# Patient Record
Sex: FEMALE | Race: WHITE | NOT HISPANIC OR LATINO
[De-identification: names, ages, dates, MRNs, and addresses within clinical notes are randomized per-mention and may not be internally consistent; named-entity substitution may affect disease eponyms.]

---

## 2017-01-17 ENCOUNTER — APPOINTMENT (OUTPATIENT)
Dept: OBGYN | Facility: CLINIC | Age: 32
End: 2017-01-17

## 2017-01-17 VITALS
SYSTOLIC BLOOD PRESSURE: 137 MMHG | WEIGHT: 125 LBS | BODY MASS INDEX: 20.09 KG/M2 | DIASTOLIC BLOOD PRESSURE: 78 MMHG | HEIGHT: 66 IN

## 2017-01-17 DIAGNOSIS — Z80.1 FAMILY HISTORY OF MALIGNANT NEOPLASM OF TRACHEA, BRONCHUS AND LUNG: ICD-10-CM

## 2017-01-18 LAB
CANDIDA VAG CYTO: NOT DETECTED
G VAGINALIS+PREV SP MTYP VAG QL MICRO: NOT DETECTED
HBV SURFACE AG SER QL: NONREACTIVE
HCV AB SER QL: NONREACTIVE
HCV S/CO RATIO: 0.07 S/CO
HIV1+2 AB SPEC QL IA.RAPID: NONREACTIVE
RPR SER QL: NORMAL
T VAGINALIS VAG QL WET PREP: NOT DETECTED

## 2017-01-19 LAB
C TRACH DNA SPEC QL NAA+PROBE: NORMAL
C TRACH RRNA SPEC QL NAA+PROBE: NORMAL
HPV HIGH+LOW RISK DNA PNL CVX: NEGATIVE
N GONORRHOEA DNA SPEC QL NAA+PROBE: NORMAL
N GONORRHOEA RRNA SPEC QL NAA+PROBE: NORMAL
SOURCE AMPLIFICATION: NORMAL

## 2017-01-23 ENCOUNTER — APPOINTMENT (OUTPATIENT)
Dept: ALLERGY | Facility: CLINIC | Age: 32
End: 2017-01-23

## 2017-01-23 VITALS
SYSTOLIC BLOOD PRESSURE: 120 MMHG | BODY MASS INDEX: 20.09 KG/M2 | DIASTOLIC BLOOD PRESSURE: 80 MMHG | RESPIRATION RATE: 14 BRPM | HEIGHT: 66 IN | WEIGHT: 125 LBS | HEART RATE: 72 BPM

## 2017-01-23 LAB — CYTOLOGY CVX/VAG DOC THIN PREP: NORMAL

## 2017-01-23 RX ORDER — CHROMIUM 200 MCG
TABLET ORAL
Refills: 0 | Status: ACTIVE | COMMUNITY

## 2017-01-23 RX ORDER — VITAMIN E ACID SUCCINATE 268 MG
TABLET ORAL
Refills: 0 | Status: ACTIVE | COMMUNITY

## 2017-01-23 RX ORDER — ASCORBIC ACID 500 MG
TABLET ORAL
Refills: 0 | Status: ACTIVE | COMMUNITY

## 2017-01-28 ENCOUNTER — TRANSCRIPTION ENCOUNTER (OUTPATIENT)
Age: 32
End: 2017-01-28

## 2017-02-02 ENCOUNTER — APPOINTMENT (OUTPATIENT)
Dept: ALLERGY | Facility: CLINIC | Age: 32
End: 2017-02-02

## 2017-02-03 ENCOUNTER — APPOINTMENT (OUTPATIENT)
Dept: FAMILY MEDICINE | Facility: CLINIC | Age: 32
End: 2017-02-03

## 2017-02-03 VITALS
TEMPERATURE: 97.9 F | BODY MASS INDEX: 20.34 KG/M2 | WEIGHT: 126 LBS | SYSTOLIC BLOOD PRESSURE: 116 MMHG | DIASTOLIC BLOOD PRESSURE: 70 MMHG | RESPIRATION RATE: 14 BRPM | HEART RATE: 64 BPM

## 2017-02-03 DIAGNOSIS — Z00.00 ENCOUNTER FOR GENERAL ADULT MEDICAL EXAMINATION W/OUT ABNORMAL FINDINGS: ICD-10-CM

## 2017-02-03 DIAGNOSIS — Z20.2 CONTACT WITH AND (SUSPECTED) EXPOSURE TO INFECTIONS WITH A PREDOMINANTLY SEXUAL MODE OF TRANSMISSION: ICD-10-CM

## 2017-02-03 DIAGNOSIS — Z78.9 OTHER SPECIFIED HEALTH STATUS: ICD-10-CM

## 2017-02-03 DIAGNOSIS — Z82.49 FAMILY HISTORY OF ISCHEMIC HEART DISEASE AND OTHER DISEASES OF THE CIRCULATORY SYSTEM: ICD-10-CM

## 2017-02-03 DIAGNOSIS — Z01.419 ENCOUNTER FOR GYNECOLOGICAL EXAMINATION (GENERAL) (ROUTINE) W/OUT ABNORMAL FINDINGS: ICD-10-CM

## 2017-02-03 DIAGNOSIS — D24.1 BENIGN NEOPLASM OF RIGHT BREAST: ICD-10-CM

## 2017-02-03 DIAGNOSIS — Z12.4 ENCOUNTER FOR GYNECOLOGICAL EXAMINATION (GENERAL) (ROUTINE) W/OUT ABNORMAL FINDINGS: ICD-10-CM

## 2017-02-03 DIAGNOSIS — T14.8 OTHER INJURY OF UNSPECIFIED BODY REGION: ICD-10-CM

## 2017-02-03 DIAGNOSIS — H57.8 OTHER SPECIFIED DISORDERS OF EYE AND ADNEXA: ICD-10-CM

## 2017-02-03 RX ORDER — MINOCYCLINE HYDROCHLORIDE 100 MG/1
100 CAPSULE ORAL
Refills: 0 | Status: DISCONTINUED | COMMUNITY
End: 2017-02-03

## 2017-02-05 PROBLEM — H57.8 PERIORBITAL SWELLING: Status: RESOLVED | Noted: 2017-01-23 | Resolved: 2017-02-05

## 2017-02-05 PROBLEM — Z01.419 ENCOUNTER FOR GYNECOLOGICAL EXAMINATION WITH PAPANICOLAOU SMEAR OF CERVIX: Status: RESOLVED | Noted: 2017-01-17 | Resolved: 2017-02-05

## 2017-02-05 PROBLEM — T14.8 BRUISE: Status: ACTIVE | Noted: 2017-02-05

## 2017-02-05 LAB
25(OH)D3 SERPL-MCNC: 38.2 NG/ML
ALBUMIN SERPL ELPH-MCNC: 4.5 G/DL
ALP BLD-CCNC: 51 U/L
ALT SERPL-CCNC: 11 U/L
ANION GAP SERPL CALC-SCNC: 16 MMOL/L
APPEARANCE: CLEAR
AST SERPL-CCNC: 23 U/L
BACTERIA: NEGATIVE
BASOPHILS # BLD AUTO: 0.02 K/UL
BASOPHILS NFR BLD AUTO: 0.4 %
BILIRUB SERPL-MCNC: 0.7 MG/DL
BILIRUBIN URINE: NEGATIVE
BLOOD URINE: NEGATIVE
BUN SERPL-MCNC: 10 MG/DL
CALCIUM SERPL-MCNC: 9.5 MG/DL
CHLORIDE SERPL-SCNC: 104 MMOL/L
CHOLEST SERPL-MCNC: 189 MG/DL
CHOLEST/HDLC SERPL: 2.6 RATIO
CO2 SERPL-SCNC: 20 MMOL/L
COLOR: YELLOW
CREAT SERPL-MCNC: 0.95 MG/DL
EOSINOPHIL # BLD AUTO: 0.05 K/UL
EOSINOPHIL NFR BLD AUTO: 1.1 %
GLUCOSE QUALITATIVE U: NORMAL MG/DL
GLUCOSE SERPL-MCNC: 97 MG/DL
HBA1C MFR BLD HPLC: 5.1 %
HCT VFR BLD CALC: 41.2 %
HDLC SERPL-MCNC: 72 MG/DL
HGB BLD-MCNC: 14 G/DL
HYALINE CASTS: 0 /LPF
IMM GRANULOCYTES NFR BLD AUTO: 0 %
KETONES URINE: NEGATIVE
LDLC SERPL CALC-MCNC: 99 MG/DL
LEUKOCYTE ESTERASE URINE: NEGATIVE
LYMPHOCYTES # BLD AUTO: 1.69 K/UL
LYMPHOCYTES NFR BLD AUTO: 36 %
MAN DIFF?: NORMAL
MCHC RBC-ENTMCNC: 30.2 PG
MCHC RBC-ENTMCNC: 34 GM/DL
MCV RBC AUTO: 88.8 FL
MICROSCOPIC-UA: NORMAL
MONOCYTES # BLD AUTO: 0.3 K/UL
MONOCYTES NFR BLD AUTO: 6.4 %
NEUTROPHILS # BLD AUTO: 2.64 K/UL
NEUTROPHILS NFR BLD AUTO: 56.1 %
NITRITE URINE: NEGATIVE
PH URINE: 6
PLATELET # BLD AUTO: 192 K/UL
POTASSIUM SERPL-SCNC: 4.4 MMOL/L
PROT SERPL-MCNC: 6.8 G/DL
PROTEIN URINE: NEGATIVE MG/DL
RBC # BLD: 4.64 M/UL
RBC # FLD: 12.9 %
RED BLOOD CELLS URINE: 1 /HPF
SODIUM SERPL-SCNC: 140 MMOL/L
SPECIFIC GRAVITY URINE: 1.02
SQUAMOUS EPITHELIAL CELLS: 12 /HPF
TRIGL SERPL-MCNC: 92 MG/DL
TSH SERPL-ACNC: 1.84 UIU/ML
URINE COMMENTS: NORMAL
UROBILINOGEN URINE: NORMAL MG/DL
VIT B12 SERPL-MCNC: 1058 PG/ML
WBC # FLD AUTO: 4.7 K/UL
WHITE BLOOD CELLS URINE: 1 /HPF

## 2017-02-14 ENCOUNTER — APPOINTMENT (OUTPATIENT)
Dept: ALLERGY | Facility: CLINIC | Age: 32
End: 2017-02-14

## 2018-08-03 ENCOUNTER — APPOINTMENT (OUTPATIENT)
Dept: OBGYN | Facility: CLINIC | Age: 33
End: 2018-08-03

## 2018-09-07 ENCOUNTER — APPOINTMENT (OUTPATIENT)
Dept: OBGYN | Facility: CLINIC | Age: 33
End: 2018-09-07
Payer: COMMERCIAL

## 2018-09-07 VITALS
HEIGHT: 66 IN | WEIGHT: 126 LBS | HEART RATE: 74 BPM | DIASTOLIC BLOOD PRESSURE: 64 MMHG | SYSTOLIC BLOOD PRESSURE: 122 MMHG | BODY MASS INDEX: 20.25 KG/M2 | RESPIRATION RATE: 16 BRPM | OXYGEN SATURATION: 98 %

## 2018-09-07 DIAGNOSIS — Z31.89 ENCOUNTER FOR OTHER PROCREATIVE MANAGEMENT: ICD-10-CM

## 2018-09-07 DIAGNOSIS — Z01.419 ENCOUNTER FOR GYNECOLOGICAL EXAMINATION (GENERAL) (ROUTINE) W/OUT ABNORMAL FINDINGS: ICD-10-CM

## 2018-09-07 PROCEDURE — 99395 PREV VISIT EST AGE 18-39: CPT

## 2018-09-09 LAB
C TRACH RRNA SPEC QL NAA+PROBE: NOT DETECTED
HPV HIGH+LOW RISK DNA PNL CVX: NOT DETECTED
N GONORRHOEA RRNA SPEC QL NAA+PROBE: NOT DETECTED
SOURCE TP AMPLIFICATION: NORMAL

## 2018-09-12 LAB — CYTOLOGY CVX/VAG DOC THIN PREP: NORMAL

## 2020-12-16 PROBLEM — Z01.419 ENCOUNTER FOR ANNUAL ROUTINE GYNECOLOGICAL EXAMINATION: Status: RESOLVED | Noted: 2018-09-07 | Resolved: 2020-12-16

## 2021-03-18 ENCOUNTER — RESULT REVIEW (OUTPATIENT)
Age: 36
End: 2021-03-18

## 2023-06-15 ENCOUNTER — APPOINTMENT (OUTPATIENT)
Dept: ENDOCRINOLOGY | Facility: CLINIC | Age: 38
End: 2023-06-15
Payer: COMMERCIAL

## 2023-06-15 ENCOUNTER — LABORATORY RESULT (OUTPATIENT)
Age: 38
End: 2023-06-15

## 2023-06-15 VITALS
BODY MASS INDEX: 20.09 KG/M2 | SYSTOLIC BLOOD PRESSURE: 120 MMHG | HEART RATE: 94 BPM | DIASTOLIC BLOOD PRESSURE: 72 MMHG | TEMPERATURE: 97.3 F | OXYGEN SATURATION: 98 % | WEIGHT: 125 LBS | HEIGHT: 66 IN

## 2023-06-15 PROCEDURE — 99204 OFFICE O/P NEW MOD 45 MIN: CPT

## 2023-06-15 NOTE — PHYSICAL EXAM
[de-identified] : General: No distress, well nourished\par Eyes: Normal Sclera, EOMI, PERRL\par ENT: Normal appearance of the nose, normal oropharynx\par Neck/Thyroid: No cervical lymphadenopathy, thyroid gland 20 g in size, no thyroid nodules, non-tender\par Respiratory: No use of accessory muscles of respiration, vesicular breath sounds heard bilaterally, no crepitations or ronchi\par Cardiovascular: S1 and S2 heard and normal, no S3 or S4, no murmurs, radial pulse normal bilaterally\par Abdomen: soft, non-tender, no masses, normal bowel sounds\par Musculoskeletal: No swelling or deformities of joints of hands, no pedal edema\par Neurological: Normal range of motion in the hands, Normal brachioradialis reflexes bilaterally\par Psychiatry: Patient converses normally, good judgement and insight\par Skin: No rashes in hands, no nodules palpated in hands

## 2023-06-15 NOTE — HISTORY OF PRESENT ILLNESS
[FreeTextEntry1] : Problems:\par 1. Thyrotoxicosis in pregnancy\par \par Patient is 7 weeks pregnant. Due date - Feb 2nd 2024\par \par Thyrotoxicosis in pregnancy\par 1. Patient underwent IVF and in November 2022, her TSH was high normal  and she was prescribed levothyroxine to aid with fertility. She was on levothyroxine 50 micrograms po daily from 12/01/2022 to 05/05/2023. She also followed up with an alternative doctor who also prescribed Spark Plug supplement that contains 125 mg of thyroid tissue.  Levothyroxine was discontinued on 05/05/2023 when her TSH was low and she stopped the spark plug supplement on 06/05/2023 when her TSH was low.\par 2. Mother has thyroid nodules, no family history of thyroid cancer, no personal history of radiation therapy\par 3. No Graves' orbitopathy or dermopathy\par

## 2023-06-15 NOTE — ASSESSMENT
[FreeTextEntry1] : The patient has thyrotoxicosis in pregnancy - she was on levothyroxine and a supplement that contained thyroid tissue so her thyrotoxicosis may be due to exogenous use of thyroid replacement hormones. She stopped levothyroxine on 05/05/2023 and she stopped the thyroid tissue supplement on 06/05/2023 - it takes at least 6 weeks for the TSH to normalize off the thyroid hormone replacement. \par \par I ordered labs to evaluate her for autoimmune thyroid disorder and I ordered a TSH, Free T4 and Free T3 level.\par \par THE PATIENT CANNOT HAVE A THYROID UPTAKE AND SCAN DONE AS SHE IS PREGNANT.\par SHE DOES NOT WISH TO HAVE A THYROID US DONE AT THIS TIME.\par \par Plan:\par 1. Labs to be done today - see below\par 2. Follow up in 2 weeks to review results - telehealth visit

## 2023-06-16 ENCOUNTER — TRANSCRIPTION ENCOUNTER (OUTPATIENT)
Age: 38
End: 2023-06-16

## 2023-06-16 LAB
ALBUMIN SERPL ELPH-MCNC: 4.3 G/DL
ALP BLD-CCNC: 49 U/L
ALT SERPL-CCNC: 11 U/L
ANION GAP SERPL CALC-SCNC: 14 MMOL/L
AST SERPL-CCNC: 15 U/L
BILIRUB SERPL-MCNC: 0.5 MG/DL
BUN SERPL-MCNC: 8 MG/DL
CALCIUM SERPL-MCNC: 9.7 MG/DL
CHLORIDE SERPL-SCNC: 104 MMOL/L
CO2 SERPL-SCNC: 21 MMOL/L
CREAT SERPL-MCNC: 0.67 MG/DL
EGFR: 113 ML/MIN/1.73M2
ERYTHROCYTE [SEDIMENTATION RATE] IN BLOOD BY WESTERGREN METHOD: 8 MM/HR
GLUCOSE SERPL-MCNC: 80 MG/DL
POTASSIUM SERPL-SCNC: 4.1 MMOL/L
PROT SERPL-MCNC: 6.6 G/DL
SODIUM SERPL-SCNC: 138 MMOL/L
T3FREE SERPL-MCNC: 2.17 PG/ML
T4 FREE SERPL-MCNC: 0.9 NG/DL
THYROGLOB AB SERPL-ACNC: <20 IU/ML
THYROGLOB SERPL-MCNC: 4.19 NG/ML
THYROPEROXIDASE AB SERPL IA-ACNC: <10 IU/ML
TSH SERPL-ACNC: 0.54 UIU/ML

## 2023-06-19 ENCOUNTER — APPOINTMENT (OUTPATIENT)
Dept: ENDOCRINOLOGY | Facility: CLINIC | Age: 38
End: 2023-06-19
Payer: COMMERCIAL

## 2023-06-19 LAB — TSH RECEPTOR AB: <1.1 IU/L

## 2023-06-19 PROCEDURE — 99214 OFFICE O/P EST MOD 30 MIN: CPT | Mod: 95

## 2023-06-19 NOTE — REASON FOR VISIT
[Home] : at home, [unfilled] , at the time of the visit. [Medical Office: ([facility])___] : at the medical office located in  [Patient] : the patient [Follow - Up] : a follow-up visit [Hyperthyroidism] : hyperthyroidism

## 2023-06-19 NOTE — PHYSICAL EXAM
[de-identified] : General: No distress, well nourished\par Eyes: Normal external appearance\par ENT: Normal appearance of the nose\par Neck/Thyroid: No visible neck swelling\par Respiratory: No use of accessory muscles of respiration\par Psychiatry: Patient converses normally, good judgement and insight\par Skin: No rashes seen on face

## 2023-06-19 NOTE — HISTORY OF PRESENT ILLNESS
[FreeTextEntry1] : Problems:\par 1. Thyrotoxicosis in pregnancy\par \par Patient is 8 weeks pregnant. Due date - Feb 2nd 2024\par \par Thyrotoxicosis in pregnancy\par 1. Patient underwent IVF and in November 2022, her TSH was high normal  and she was prescribed levothyroxine to aid with fertility. She was on levothyroxine 50 micrograms po daily from 12/01/2022 to 05/05/2023. She also followed up with an alternative doctor who also prescribed Spark Plug supplement that contains 125 mg of thyroid tissue.  Levothyroxine was discontinued on 05/05/2023 when her TSH was low and she stopped the spark plug supplement on 06/05/2023 when her TSH was low.\par On 06/15/2023, her TSH was normal. \par 2. Labs:\par 2017 - TSH N\par 06/15/2023 - TSH - N, Free T4 - N, Free T3 N, thyroglobulin N, thyroglobulin antibodies N, TPO antibodies N, TSH-R abs N\par 3. Mother has thyroid nodules, no family history of thyroid cancer, no personal history of radiation therapy\par 4. No Graves' orbitopathy or dermopathy\par

## 2023-06-19 NOTE — ASSESSMENT
[FreeTextEntry1] : The patient has thyrotoxicosis in pregnancy - she was on levothyroxine and a supplement that contained thyroid tissue so her thyrotoxicosis may be due to exogenous use of thyroid replacement hormones. She stopped levothyroxine on 05/05/2023 and she stopped the thyroid tissue supplement on 06/05/2023.\par \par On 06/15/2023, her TSH was normal thus at this time, it is thought that her thyrotoxicosis was due to exogenous thyroid use. \par \par Labs from June 2023 revealed normal TPO antibodies, thyroglobulin antibodies and TSH-R\par \par \par THE PATIENT CANNOT HAVE A THYROID UPTAKE AND SCAN DONE AS SHE IS PREGNANT.\par SHE DOES NOT WISH TO HAVE A THYROID US DONE AT THIS TIME.\par \par Plan:\par 1. Labs to be done in 2 months - see below\par 2. Follow up in 2 months to review results - telehealth visit

## 2023-06-20 LAB — TSI ACT/NOR SER: <0.1 IU/L

## 2023-07-24 ENCOUNTER — NON-APPOINTMENT (OUTPATIENT)
Age: 38
End: 2023-07-24

## 2023-07-25 ENCOUNTER — APPOINTMENT (OUTPATIENT)
Dept: UROGYNECOLOGY | Facility: CLINIC | Age: 38
End: 2023-07-25
Payer: COMMERCIAL

## 2023-07-25 VITALS
TEMPERATURE: 97.6 F | HEIGHT: 66 IN | DIASTOLIC BLOOD PRESSURE: 60 MMHG | WEIGHT: 125 LBS | BODY MASS INDEX: 20.09 KG/M2 | OXYGEN SATURATION: 99 % | HEART RATE: 89 BPM | SYSTOLIC BLOOD PRESSURE: 118 MMHG

## 2023-07-25 LAB
BILIRUB UR QL STRIP: NORMAL
CLARITY UR: CLEAR
COLLECTION METHOD: NORMAL
GLUCOSE UR-MCNC: NORMAL
HCG UR QL: 0.2 EU/DL
HGB UR QL STRIP.AUTO: NORMAL
KETONES UR-MCNC: NORMAL
LEUKOCYTE ESTERASE UR QL STRIP: NORMAL
NITRITE UR QL STRIP: NORMAL
PH UR STRIP: 6
PROT UR STRIP-MCNC: NORMAL
SP GR UR STRIP: 1.02

## 2023-07-25 PROCEDURE — 51701 INSERT BLADDER CATHETER: CPT

## 2023-07-25 PROCEDURE — 99204 OFFICE O/P NEW MOD 45 MIN: CPT | Mod: 25

## 2023-07-25 NOTE — HISTORY OF PRESENT ILLNESS
[FreeTextEntry1] : 38-year-old patient  1 para 0 approximately 12 weeks pregnant with symptoms of voiding dysfunction she voids in small amounts sometimes has to change positions and strain to a mild degree.  She is of the thought that she may have uterine prolapse.\par \par She is an IVF cycle at 12 weeks of pregnancy\par \par She has a history of uterine polyp removal in 2022\par \par Her obstetrician's is Alirio Hahn \par \par She voided approximately an hour before coming to the office and was unable to void on arrival in the office.\par \par Examination is consistent with 12 weeks pregnancy\par Sterile digital examination with 1 finger reveals enlarged uterus consistent with dates and there is some angulation at the bladder neck\par The uterus lead edges at the mid vagina\par \par \par Sterile catheterization of the bladder reveals a volume of 40 cc.  A mild kink point is notable on passage of the catheter\par \par Analysis demonstrates:  completely negative\par \par Impression\par \par Patient gives a history of slow urinary stream and at times inability to empty the bladder associated with first trimester pregnancy.  The anatomy and catheterization suggest angulation at the bladder neck however the bladder volume 1 hour after spontaneous void is only 40 cc. \par \par This patient has significant anxiety associated with the pregnancy and I have given her instructions on mind-body relaxation techniques explained the physiology of voiding which requires relaxation of the pelvic floor.  I recommended meditation and deep breathing instruction provided a prescription for pelvic floor relaxation training.  I will specify on that prescription that she should not have intravaginal work but be taught mind-body and relaxation techniques.\par \par Today's assessment does not show retention and she may have slow stream based on anxiety and some anatomic issues but it is not demonstrated today.  It is likely that there are different times of the day and different days when the relative voiding dysfunction differs.\par \par A pessary is available for elevation of the uterus but I think it should be demonstrated that she has retention before consideration of such the patient would like to discuss this with Dr. Hahn prior to anything being placed in the vagina.\par \par If she feels she is retaining higher levels she should have a sonogram or catheterization we can see if her impression of retention is objectively demonstrated\par \par Review of previous notes, labs, current prescriptions was performed.\par History , Physical counseling was performed. \par All questions answered in lay language\par Total time for encounter was   47    min\par A chaperone was present for the entirety of the encounter\par \par

## 2023-07-28 ENCOUNTER — EMERGENCY (EMERGENCY)
Facility: HOSPITAL | Age: 38
LOS: 1 days | Discharge: HOME | End: 2023-07-28
Attending: EMERGENCY MEDICINE | Admitting: EMERGENCY MEDICINE
Payer: COMMERCIAL

## 2023-07-28 VITALS
TEMPERATURE: 98 F | OXYGEN SATURATION: 100 % | DIASTOLIC BLOOD PRESSURE: 78 MMHG | HEART RATE: 92 BPM | WEIGHT: 130.07 LBS | SYSTOLIC BLOOD PRESSURE: 124 MMHG | RESPIRATION RATE: 15 BRPM | HEIGHT: 66 IN

## 2023-07-28 VITALS
RESPIRATION RATE: 20 BRPM | DIASTOLIC BLOOD PRESSURE: 67 MMHG | HEART RATE: 97 BPM | SYSTOLIC BLOOD PRESSURE: 107 MMHG | TEMPERATURE: 98 F | OXYGEN SATURATION: 100 %

## 2023-07-28 LAB
ALBUMIN SERPL ELPH-MCNC: 3.4 G/DL — SIGNIFICANT CHANGE UP (ref 3.3–5)
ALP SERPL-CCNC: 47 U/L — SIGNIFICANT CHANGE UP (ref 30–120)
ALT FLD-CCNC: 18 U/L DA — SIGNIFICANT CHANGE UP (ref 10–60)
ANION GAP SERPL CALC-SCNC: 10 MMOL/L — SIGNIFICANT CHANGE UP (ref 5–17)
APPEARANCE UR: CLEAR — SIGNIFICANT CHANGE UP
AST SERPL-CCNC: 20 U/L — SIGNIFICANT CHANGE UP (ref 10–40)
BASOPHILS # BLD AUTO: 0.01 K/UL — SIGNIFICANT CHANGE UP (ref 0–0.2)
BASOPHILS NFR BLD AUTO: 0.1 % — SIGNIFICANT CHANGE UP (ref 0–2)
BILIRUB SERPL-MCNC: 0.5 MG/DL — SIGNIFICANT CHANGE UP (ref 0.2–1.2)
BILIRUB UR-MCNC: NEGATIVE — SIGNIFICANT CHANGE UP
BUN SERPL-MCNC: 9 MG/DL — SIGNIFICANT CHANGE UP (ref 7–23)
CALCIUM SERPL-MCNC: 9.3 MG/DL — SIGNIFICANT CHANGE UP (ref 8.4–10.5)
CHLORIDE SERPL-SCNC: 102 MMOL/L — SIGNIFICANT CHANGE UP (ref 96–108)
CO2 SERPL-SCNC: 24 MMOL/L — SIGNIFICANT CHANGE UP (ref 22–31)
COLOR SPEC: YELLOW — SIGNIFICANT CHANGE UP
CREAT SERPL-MCNC: 0.57 MG/DL — SIGNIFICANT CHANGE UP (ref 0.5–1.3)
DIFF PNL FLD: NEGATIVE — SIGNIFICANT CHANGE UP
EGFR: 118 ML/MIN/1.73M2 — SIGNIFICANT CHANGE UP
EOSINOPHIL # BLD AUTO: 0 K/UL — SIGNIFICANT CHANGE UP (ref 0–0.5)
EOSINOPHIL NFR BLD AUTO: 0 % — SIGNIFICANT CHANGE UP (ref 0–6)
GLUCOSE SERPL-MCNC: 97 MG/DL — SIGNIFICANT CHANGE UP (ref 70–99)
GLUCOSE UR QL: NEGATIVE MG/DL — SIGNIFICANT CHANGE UP
HCG SERPL-ACNC: HIGH MIU/ML
HCG UR QL: POSITIVE
HCT VFR BLD CALC: 38.8 % — SIGNIFICANT CHANGE UP (ref 34.5–45)
HGB BLD-MCNC: 13.2 G/DL — SIGNIFICANT CHANGE UP (ref 11.5–15.5)
IMM GRANULOCYTES NFR BLD AUTO: 0.3 % — SIGNIFICANT CHANGE UP (ref 0–0.9)
KETONES UR-MCNC: NEGATIVE MG/DL — SIGNIFICANT CHANGE UP
LEUKOCYTE ESTERASE UR-ACNC: NEGATIVE — SIGNIFICANT CHANGE UP
LYMPHOCYTES # BLD AUTO: 1.08 K/UL — SIGNIFICANT CHANGE UP (ref 1–3.3)
LYMPHOCYTES # BLD AUTO: 11.4 % — LOW (ref 13–44)
MCHC RBC-ENTMCNC: 29.3 PG — SIGNIFICANT CHANGE UP (ref 27–34)
MCHC RBC-ENTMCNC: 34 GM/DL — SIGNIFICANT CHANGE UP (ref 32–36)
MCV RBC AUTO: 86.2 FL — SIGNIFICANT CHANGE UP (ref 80–100)
MONOCYTES # BLD AUTO: 0.37 K/UL — SIGNIFICANT CHANGE UP (ref 0–0.9)
MONOCYTES NFR BLD AUTO: 3.9 % — SIGNIFICANT CHANGE UP (ref 2–14)
NEUTROPHILS # BLD AUTO: 7.95 K/UL — HIGH (ref 1.8–7.4)
NEUTROPHILS NFR BLD AUTO: 84.3 % — HIGH (ref 43–77)
NITRITE UR-MCNC: NEGATIVE — SIGNIFICANT CHANGE UP
NRBC # BLD: 0 /100 WBCS — SIGNIFICANT CHANGE UP (ref 0–0)
PH UR: 6 — SIGNIFICANT CHANGE UP (ref 5–8)
PLATELET # BLD AUTO: 206 K/UL — SIGNIFICANT CHANGE UP (ref 150–400)
POTASSIUM SERPL-MCNC: 3.6 MMOL/L — SIGNIFICANT CHANGE UP (ref 3.5–5.3)
POTASSIUM SERPL-SCNC: 3.6 MMOL/L — SIGNIFICANT CHANGE UP (ref 3.5–5.3)
PROT SERPL-MCNC: 6.9 G/DL — SIGNIFICANT CHANGE UP (ref 6–8.3)
PROT UR-MCNC: NEGATIVE MG/DL — SIGNIFICANT CHANGE UP
RBC # BLD: 4.5 M/UL — SIGNIFICANT CHANGE UP (ref 3.8–5.2)
RBC # FLD: 12.7 % — SIGNIFICANT CHANGE UP (ref 10.3–14.5)
SODIUM SERPL-SCNC: 136 MMOL/L — SIGNIFICANT CHANGE UP (ref 135–145)
SP GR SPEC: 1.01 — SIGNIFICANT CHANGE UP (ref 1–1.03)
UROBILINOGEN FLD QL: 0.2 MG/DL — SIGNIFICANT CHANGE UP (ref 0.2–1)
WBC # BLD: 9.44 K/UL — SIGNIFICANT CHANGE UP (ref 3.8–10.5)
WBC # FLD AUTO: 9.44 K/UL — SIGNIFICANT CHANGE UP (ref 3.8–10.5)

## 2023-07-28 PROCEDURE — 81025 URINE PREGNANCY TEST: CPT

## 2023-07-28 PROCEDURE — 99284 EMERGENCY DEPT VISIT MOD MDM: CPT | Mod: 25

## 2023-07-28 PROCEDURE — 87086 URINE CULTURE/COLONY COUNT: CPT

## 2023-07-28 PROCEDURE — 76801 OB US < 14 WKS SINGLE FETUS: CPT

## 2023-07-28 PROCEDURE — 81003 URINALYSIS AUTO W/O SCOPE: CPT

## 2023-07-28 PROCEDURE — 36415 COLL VENOUS BLD VENIPUNCTURE: CPT

## 2023-07-28 PROCEDURE — 85025 COMPLETE CBC W/AUTO DIFF WBC: CPT

## 2023-07-28 PROCEDURE — 84702 CHORIONIC GONADOTROPIN TEST: CPT

## 2023-07-28 PROCEDURE — 76801 OB US < 14 WKS SINGLE FETUS: CPT | Mod: 26

## 2023-07-28 PROCEDURE — 80053 COMPREHEN METABOLIC PANEL: CPT

## 2023-07-28 PROCEDURE — 99283 EMERGENCY DEPT VISIT LOW MDM: CPT

## 2023-07-28 RX ORDER — SODIUM CHLORIDE 9 MG/ML
1000 INJECTION INTRAMUSCULAR; INTRAVENOUS; SUBCUTANEOUS ONCE
Refills: 0 | Status: COMPLETED | OUTPATIENT
Start: 2023-07-28 | End: 2023-07-28

## 2023-07-28 RX ORDER — CEPHALEXIN 500 MG
1 CAPSULE ORAL
Qty: 6 | Refills: 0
Start: 2023-07-28 | End: 2023-07-30

## 2023-07-28 RX ADMIN — SODIUM CHLORIDE 1000 MILLILITER(S): 9 INJECTION INTRAMUSCULAR; INTRAVENOUS; SUBCUTANEOUS at 12:18

## 2023-07-28 NOTE — ED PROVIDER NOTE - OBJECTIVE STATEMENT
38-year-old pregnant female through IVF  LMP: at 13 weeks gestation with history of uterine prolapse presents with complaint of difficulty urinating today.  Patient states that she has had uterine prolapse since , saw her GYN, Dr. Jovani Amezquita who evaluated patient and had ultrasound in the office last week.  States that she had follow-up with GYN today and was advised to come to ER due to difficulty urinating.  Patient states that she last urinated last night and has not been able to urinate since 5 AM this morning.  Patient admits to lower abdominal pressure radiating to her lower back and feels distended.  Denies fever, nausea, vomiting, dysuria, hematuria, vaginal bleeding/discharge or other symptoms. 38-year-old pregnant female through IVF  LMP: at 13 weeks gestation with history of uterine prolapse presents with complaint of difficulty urinating today.  Patient states that she has had uterine prolapse since , saw her GYN, Dr. Jovani Amezquita who evaluated patient and had ultrasound in the office last week. States that she has had some trouble urinating the past few days and had to shift her position to urinate. States that she had follow-up with GYN today and was advised to come to ER due to difficulty urinating.  Patient states that she last urinated last night and has not been able to urinate since 5 AM this morning.  Patient admits to lower abdominal pressure radiating to her lower back and feels distended.  Denies fever, nausea, vomiting, dysuria, hematuria, vaginal bleeding/discharge or other symptoms. 38-year-old pregnant female through IVF  LMP: at 13 weeks gestation with history of uterine prolapse presents with complaint of difficulty urinating today.  Patient states that she has had uterine prolapse since , saw her GYN, Dr. Jovani Amezquita who evaluated patient and had ultrasound in the office last week. States that she saw a pelvic  on Monday. States that she has had some trouble urinating the past few days and had to shift her position to urinate. States that she had follow-up with GYN today and was advised to come to ER due to difficulty urinating.  Patient states that she last urinated last night and has not been able to urinate since 5 AM this morning. Patient admits to lower abdominal pressure radiating to her lower back and feels distended.  Denies fever, nausea, vomiting, dysuria, hematuria, vaginal bleeding/discharge or other symptoms.

## 2023-07-28 NOTE — ED PROVIDER NOTE - CARE PROVIDER_API CALL
Alirio Hahn  Obstetrics and Gynecology  [address]  [place] [address]  Phone: [phone]  Fax: [phone]  Follow Up Time: 1-3 Days

## 2023-07-28 NOTE — ED ADULT NURSE NOTE - OBJECTIVE STATEMENT
Pt is a 38yr old female, c/o urinary retention (on bladder scan 863ml), and 13 weeks pregnant confirmed by US. Pt denies hematuria, blood in stool. Pt is A+Ox3, calm and cooperative, able to move all extremities. Unlabored breathing at this time. Abdomen soft and mildly distended. Cap refill less than 2 seconds. No distress noted. MD evaluation in progress. Pt is a 38yr old female, c/o urinary retention (on bladder scan 863ml), and 13 weeks pregnant confirmed by US. Pt denies hematuria, blood in stool. Pt is A+Ox3, calm and cooperative, able to move all extremities. Unlabored breathing at this time. Abdomen soft and mildly distended. Cap refill less than 2 seconds. No distress noted. MD evaluation in progress.    Vicente catheter placed in ED

## 2023-07-28 NOTE — ED ADULT NURSE NOTE - NSFALLUNIVINTERV_ED_ALL_ED
Bed/Stretcher in lowest position, wheels locked, appropriate side rails in place/Call bell, personal items and telephone in reach/Instruct patient to call for assistance before getting out of bed/chair/stretcher/Non-slip footwear applied when patient is off stretcher/[place] to call system/Physically safe environment - no spills, clutter or unnecessary equipment/Purposeful proactive rounding/Room/bathroom lighting operational, light cord in reach

## 2023-07-28 NOTE — ED PROVIDER NOTE - PROGRESS NOTE DETAILS
Pt spoke to GYN while in ED who advised to keep mata and f/u in office tomorrow for re-evaluation. Will d/c on keflex x 3d and f/u with GYN. Reevaluated patient at bedside.  Patient feeling much improved.  Discussed the results of all diagnostic testing in ED and copies of all reports given.  Pt aware of uterine myoma. Advised will rx keflex and f.u gyn tomorrow.   An opportunity to ask questions was given.  Discussed the importance of prompt, close medical follow-up.  Patient will return with any changes, concerns or persistent / worsening symptoms.  Understanding of all instructions verbalized.

## 2023-07-28 NOTE — ED PROVIDER NOTE - NSFOLLOWUPINSTRUCTIONS_ED_ALL_ED_FT
Follow-up with your OB/GYN tomorrow for reevaluation, ongoing care and treatment.  Rest, stay hydrated.  Take antibiotics as prescribed.  If having worsening of symptoms, fever, vomiting, blood in urine or other related symptoms, return to the ER immediately.    Acute Urinary Retention, Female    Acute urinary retention is when a person cannot pee (urinate) at all, or can only pee a little. This can come on all of a sudden. If it is not treated, it can lead to kidney problems or other serious problems.    What are the causes?  A problem with the tube that drains the bladder (urethra).  Problems with the nerves in the bladder.  The organs in the area between your hip bones (pelvis) slipping out of place (prolapse).  Tumors.  The birth of a baby through the vagina.  An infection.  Having trouble pooping (constipation).  Certain medicines.  What increases the risk?  Women over age 50 are more at risk. Other conditions also can increase risk. These include:  Diseases, such as multiple sclerosis.  Injury to the spinal cord.  Diabetes.  A condition that affects the way the brain works, such as dementia.  Holding back urine due to trauma or because you do not want to use the bathroom.  History of not being able to pee or peeing too little.  Having had surgery in the area between your hip bones.  What are the signs or symptoms?  Trouble peeing.  Pain in the lower belly.  How is this treated?  Treatment for this condition may include:  Medicines.  Placing a thin, germ-free tube (catheter) into the bladder to drain pee out of the body.  Therapy to treat mental health conditions.  Treatment for conditions that may cause this.  If needed, you may be treated in the hospital for kidney problems or to manage other problems.    Follow these instructions at home:  Medicines    Take over-the-counter and prescription medicines only as told by your doctor. Ask your doctor what medicines you should stay away from.  If you were given an antibiotic medicine, take it as told by your doctor. Do not stop taking it even if you start to feel better.  General instructions    Do not smoke or use any products that contain nicotine or tobacco. If you need help quitting, ask your doctor.  Drink enough fluid to keep your pee pale yellow.  If you were sent home with a tube that drains the bladder, take care of it as told by your doctor.  Watch for changes in your symptoms. Tell your doctor about them.  If told, keep track of changes in your blood pressure at home. Tell your doctor about them.  Keep all follow-up visits.  Contact a doctor if:  You have spasms in your bladder that you cannot stop.  You leak pee when you have spasms.  Get help right away if:  You have chills or a fever.  You have blood in your pee.  You have a tube that drains pee from the bladder and these things happen:  The tube stops draining pee.  The tube falls out.  Summary  Acute urinary retention is when you cannot pee at all or you pee too little.  If this is not treated, it can cause kidney problems or other serious problems.  If you were sent home with a tube (catheter) that drains pee from the bladder, take care of it as told by your doctor.  Watch for changes in your symptoms. Tell your doctor about them.  This information is not intended to replace advice given to you by your health care provider. Make sure you discuss any questions you have with your health care provider.

## 2023-07-28 NOTE — ED PROVIDER NOTE - GASTROINTESTINAL, MLM
lower abdominal distention and tenderness due to urinary retention, no guarding. No CVAT B +mild lower abdominal distention and tenderness due to urinary retention, no guarding. No CVAT B

## 2023-07-28 NOTE — ED PROVIDER NOTE - DIFFERENTIAL DIAGNOSIS
Patient presenting to the emergency room with reported urinary retention in the setting of uterine prolapse.  Prolapse self resolved at this time.  Will obtain screening labs to Check Kidney Function Place, Vicente catheter for retention check UA urine culture and obtain abdominal ultrasound.  Patient already reached out to her own GYN who is in agreement with this plan and can see her tomorrow in the office for possible Vicente catheter removal. Differential Diagnosis

## 2023-07-28 NOTE — ED PROVIDER NOTE - PATIENT PORTAL LINK FT
You can access the FollowMyHealth Patient Portal offered by [facility] by registering at the following website: [email or web address]. By joining [employer]’s FollowMyHealth portal, you will also be able to view your health information using other applications (apps) compatible with our system.

## 2023-07-28 NOTE — ED PROVIDER NOTE - CLINICAL SUMMARY MEDICAL DECISION MAKING FREE TEXT BOX
Glenroy is a 38-year-old female who presents to the emergency room with a chief complaint of urinary retention.  Patient with no significant past medical history.  Reports that she is currently approximately 13 weeks pregnant via IVF.  She is a G1, P0.  Her LMP was April 29 and her estimated due date is February 1.  She reports that since July 18 she has been experiencing intermittent uterine prolapse worse throughout the day better in the morning.  She did follow-up with her GYN who evaluated her and did an ultrasound in the office.  She also saw a pelvic  on Monday and has another appointment coming up.  She reports initially she had no issues urinating.  Over the last several days she has noticed some difficulty urinating she is normally able to shift her position began to urinate.  Patient reports that she urinated last night but this morning since 5 AM she has been unable to urinate.  She also notes lower abdominal pressure rating to her back and feels like she is distended.  Denies fevers chills nausea vomiting chest pain shortness of breath.  No vaginal bleeding or discharge.  No prior history of urinary retention.  She spoke with her GYN and was advised to come to the emergency room for further work-up and management.  On exam patient is laying in bed anxious no acute distress normocephalic atraumatic pupils equal round and reactive heart is regular rate lungs are clear to auscultation abdomen is soft with some tenderness to palpation to the suprapubic region.  External exam was performed by the PA at the bedside there is no active uterine prolapse at this time.  Patient presenting to the emergency room with reported urinary retention in the setting of uterine prolapse.  Prolapse self resolved at this time.  Will obtain screening labs to Check Kidney Function Place, Vicente catheter for retention check UA urine culture and obtain abdominal ultrasound.  Patient already reached out to her own GYN who is in agreement with this plan and can see her tomorrow in the office for possible Vicente catheter removal.

## 2023-07-29 LAB
CULTURE RESULTS: NO GROWTH — SIGNIFICANT CHANGE UP
SPECIMEN SOURCE: SIGNIFICANT CHANGE UP

## 2023-08-01 ENCOUNTER — APPOINTMENT (OUTPATIENT)
Dept: UROGYNECOLOGY | Facility: CLINIC | Age: 38
End: 2023-08-01
Payer: COMMERCIAL

## 2023-08-01 PROCEDURE — A4562: CPT

## 2023-08-01 PROCEDURE — 99214 OFFICE O/P EST MOD 30 MIN: CPT | Mod: 25

## 2023-08-01 PROCEDURE — 51701 INSERT BLADDER CATHETER: CPT

## 2023-08-01 NOTE — HISTORY OF PRESENT ILLNESS
[FreeTextEntry1] : 38-year-old patient who is 14 to 15 weeks gestation, para 1, who was seen initially for complaints of urinary retention but at the initial visit had normal voiding.  She was voiding well for 5 or 6 days after our visit and over the last 2 days had acute urinary retention and went to the emergency room 3 days ago where 750 cc was removed by catheter.  She has had frequency urgncy hesitncy the ast few days and And comes this morning with acute pain unable to urinate since last night approximately 10-11 hours.  On examination the cervix is at the introitus and the large uterus abuts the bladder anteriorly and has significant posterior projection as well. The uterus is normal for dates but sits low and does project to the bladder neck   TRANS-URETHRAL BLADDER CATHERIZATION: Due to symptoms of retention, to rule out UTI, and to rule our retention, sterile prep and bladder theterizationwas performed. Sterile prep and sterile gloves were performed throughout the examination Post Void Residual volume was  800   cc. Urine analysis demonstrated *******  Due to the bulky nature of the uterus and the uterus sitting low there seems to be Limited space in the posterior cul-de-sac to accommodate a pessary.  3.  Ring was too large to fit and a #2 ring was fitted.  I was able to move the uterus perhaps only a centimeter but we will see if this pessary makes any adjustment in the voiding mechanism.  The patient was sent for an hour to walk and drink and we will test her voiding when she returns to the office in our  I called her obstetrician Dr. Amezquita plan sure who is out for the week but left a message for him to be advised that I placed a pessary and will follow her for retention.  Review of previous notes, labs, current prescriptions was performed. History , Physical counseling was performed.  All questions answered in lay language Total time for encounter was   41    min A chaperone was present for the entirety of the encounter

## 2023-08-08 ENCOUNTER — APPOINTMENT (OUTPATIENT)
Dept: UROGYNECOLOGY | Facility: CLINIC | Age: 38
End: 2023-08-08
Payer: COMMERCIAL

## 2023-08-08 VITALS
BODY MASS INDEX: 20.09 KG/M2 | DIASTOLIC BLOOD PRESSURE: 62 MMHG | WEIGHT: 125 LBS | SYSTOLIC BLOOD PRESSURE: 116 MMHG | OXYGEN SATURATION: 98 % | HEIGHT: 66 IN | HEART RATE: 85 BPM | TEMPERATURE: 97.3 F

## 2023-08-08 LAB
ALBUMIN SERPL ELPH-MCNC: 4.2 G/DL
ALP BLD-CCNC: 55 U/L
ALT SERPL-CCNC: 7 U/L
ANION GAP SERPL CALC-SCNC: 12 MMOL/L
AST SERPL-CCNC: 15 U/L
BILIRUB SERPL-MCNC: 0.6 MG/DL
BUN SERPL-MCNC: 8 MG/DL
CALCIUM SERPL-MCNC: 9.3 MG/DL
CHLORIDE SERPL-SCNC: 102 MMOL/L
CO2 SERPL-SCNC: 24 MMOL/L
CREAT SERPL-MCNC: 0.58 MG/DL
EGFR: 117 ML/MIN/1.73M2
GLUCOSE SERPL-MCNC: 78 MG/DL
POTASSIUM SERPL-SCNC: 3.7 MMOL/L
PROT SERPL-MCNC: 6.5 G/DL
SODIUM SERPL-SCNC: 139 MMOL/L
T3FREE SERPL-MCNC: 2.58 PG/ML
T4 FREE SERPL-MCNC: 1.1 NG/DL
TSH SERPL-ACNC: 1.27 UIU/ML

## 2023-08-08 PROCEDURE — 99214 OFFICE O/P EST MOD 30 MIN: CPT

## 2023-08-08 NOTE — HISTORY OF PRESENT ILLNESS
[FreeTextEntry1] : 38-year-old patient who is 15. to 16 weeks p nelly thurman has an indwelling catheter for persistent retention felt to be related to uterine entrapment within the pelvis and urethral angulation.Engine again last week and an indwelling c.atheter was left f or 1 week  in addition to a ring pessary.  Catheter was uncomfortable. for 3 days but then she managed repositioning has been comfortable for the last 3 days.  She denies fevers chills cloudy urine or blood.  On examination there has without question been elevation of the Uterus somewhat out of the vaginal Bony pelvis region.  The pessary which would only go to th.e mid vagina now falls into a more normal position at the upper vagina and the uterus is 1 to 2 cm higher than it was on last week's examination this examination was done with a single hand and sterile gloves  I discussed her voiding trial today versus waiting another week and she felt with the difficulties adjusting to a new catheter she is in favor of waiting another week.  I counseled her regarding signs and symptoms of infection . A specimen was taken fromthe catheter today and will be sent for culture  Next week we will perform a voiding trial and likely give her self-catheterization teaching and leave the catheter out. Patient is aware that there are infectious risks associated with maintenance of the catheter but regarding the risk benefits of leaving the catheter in and becoming acutely and retention we are in agreement that keeping the catheter 1 more week make sense  Review of previous notes, labs, current prescriptions was performed. History , Physical counseling was performed.  All questions answered in lay language Total time for encounter was   45    min A chaperone was present for the entirety of the encounter

## 2023-08-09 LAB
BILIRUB UR QL STRIP: NEGATIVE
CLARITY UR: CLEAR
COLLECTION METHOD: NORMAL
GLUCOSE UR-MCNC: NEGATIVE
HCG UR QL: 0.2 EU/DL
HGB UR QL STRIP.AUTO: NEGATIVE
KETONES UR-MCNC: NEGATIVE
LEUKOCYTE ESTERASE UR QL STRIP: NEGATIVE
NITRITE UR QL STRIP: NEGATIVE
PH UR STRIP: 6
PROT UR STRIP-MCNC: NEGATIVE
SP GR UR STRIP: 1.01

## 2023-08-10 ENCOUNTER — NON-APPOINTMENT (OUTPATIENT)
Age: 38
End: 2023-08-10

## 2023-08-10 LAB — TSI ACT/NOR SER: <0.1 IU/L

## 2023-08-12 LAB — BACTERIA UR CULT: NORMAL

## 2023-08-14 ENCOUNTER — APPOINTMENT (OUTPATIENT)
Dept: ENDOCRINOLOGY | Facility: CLINIC | Age: 38
End: 2023-08-14
Payer: COMMERCIAL

## 2023-08-14 DIAGNOSIS — Z34.90 ENCOUNTER FOR SUPERVISION OF NORMAL PREGNANCY, UNSPECIFIED, UNSPECIFIED TRIMESTER: ICD-10-CM

## 2023-08-14 DIAGNOSIS — E05.90 THYROTOXICOSIS, UNSPECIFIED W/OUT THYROTOXIC CRISIS OR STORM: ICD-10-CM

## 2023-08-14 PROCEDURE — 99214 OFFICE O/P EST MOD 30 MIN: CPT | Mod: 95

## 2023-08-14 NOTE — REASON FOR VISIT
[Home] : at home, [unfilled] , at the time of the visit. [Medical Office: ([facility])___] : at the medical office located in  [Patient] : the patient [Follow - Up] : a follow-up visit

## 2023-08-14 NOTE — ASSESSMENT
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 She stopped levothyroxine on 05/05/2023 and she stopped the thyroid tissue supplement on 06/05/2023.  On 06/15/2023 and 08/07/2023, her TSH was normal thus at this time, it is thought that her thyrotoxicosis was due to exogenous thyroid use.   Labs from June 2023 revealed normal TPO antibodies, thyroglobulin antibodies, TSI leve and TSH-R   THE PATIENT CANNOT HAVE A THYROID UPTAKE AND SCAN DONE AS SHE IS PREGNANT. SHE DOES NOT WISH TO HAVE A THYROID US DONE AT THIS TIME.   Plan: 1. Labs to be done in 2 months - see below 2. Follow up in 2 months to review results - telehealth visit.

## 2023-08-14 NOTE — PHYSICAL EXAM
[de-identified] : General: No distress, well nourished Eyes: Normal external appearance ENT: Normal appearance of the nose Neck/Thyroid: No visible neck swelling Respiratory: No use of accessory muscles of respiration Psychiatry: Patient converses normally, good judgement and insight Skin: No rashes seen on face

## 2023-08-14 NOTE — HISTORY OF PRESENT ILLNESS
[FreeTextEntry1] : Problems: 1. Thyrotoxicosis in pregnancy  Patient is 15 weeks pregnant. Due date - Feb 2nd 2024  Thyrotoxicosis in pregnancy 1. Patient underwent IVF and in November 2022, her TSH was high normal  and she was prescribed levothyroxine to aid with fertility. She was on levothyroxine 50 micrograms po daily from 12/01/2022 to 05/05/2023. She also followed up with an alternative doctor who also prescribed Spark Plug supplement that contains 125 mg of thyroid tissue.  Levothyroxine was discontinued on 05/05/2023 when her TSH was low and she stopped the spark plug supplement on 06/05/2023 when her TSH was low. On 06/15/2023, her TSH was normal.  2. Labs: 2017 - TSH N 06/15/2023 - TSH - N, Free T4 - N, Free T3 N, thyroglobulin N, thyroglobulin antibodies N, TPO antibodies N, TSH-R abs N, TSI neg 08/07/2023 - TSH - 1.27 N 3. Mother has thyroid nodules, no family history of thyroid cancer, no personal history of radiation therapy 4. No Graves' orbitopathy or dermopathy

## 2023-08-15 ENCOUNTER — APPOINTMENT (OUTPATIENT)
Dept: UROGYNECOLOGY | Facility: CLINIC | Age: 38
End: 2023-08-15
Payer: COMMERCIAL

## 2023-08-15 VITALS
TEMPERATURE: 97.3 F | HEART RATE: 91 BPM | BODY MASS INDEX: 20.09 KG/M2 | OXYGEN SATURATION: 99 % | DIASTOLIC BLOOD PRESSURE: 70 MMHG | SYSTOLIC BLOOD PRESSURE: 118 MMHG | WEIGHT: 125 LBS | HEIGHT: 66 IN

## 2023-08-15 PROCEDURE — 51736 URINE FLOW MEASUREMENT: CPT

## 2023-08-15 PROCEDURE — 99214 OFFICE O/P EST MOD 30 MIN: CPT | Mod: 25

## 2023-08-15 RX ORDER — CEPHALEXIN 250 MG/1
250 CAPSULE ORAL
Qty: 10 | Refills: 0 | Status: ACTIVE | COMMUNITY
Start: 2023-08-15 | End: 1900-01-01

## 2023-08-15 NOTE — HISTORY OF PRESENT ILLNESS
[FreeTextEntry1] : Is a 38-year-old woman who had pelvic prolapse in early pregnancy and has an indwelling catheter due to acute retention.  She required several visits to the emergency room.  Her uterus appears to have grown and with the assistance of a pessary The enlarged uterus is not distinctly out of the pelvis.  The patient was filled retrograde to 250 cc and voided 307 cc  With a good voiding pattern and a maximal flow of 22 cc per Second which is normal  Patient was trained on self-catheterization but I did not pass the catheter due to likely cystitis  The urine analysis is positive for leukocytes and blood and she will take cephalexin 500 twice a day for 5 days.  The uroflow was executed and showed complete bladder emptying with a voided volume of 309 cc and normal peak urinary flow rate of 22 cc/s  The patient will return in 2 weeks for follow-up  The catheter was left out Due to the excellent voiding flow pattern.t   Review of previous notes, labs, current prescriptions was performed. History , Physical counseling was performed.  All questions answered in lay language Total time for encounter was  37    min A chaperone was present for the entirety of the encounter

## 2023-08-21 ENCOUNTER — NON-APPOINTMENT (OUTPATIENT)
Age: 38
End: 2023-08-21

## 2023-08-21 LAB — BACTERIA UR CULT: ABNORMAL

## 2023-08-29 ENCOUNTER — APPOINTMENT (OUTPATIENT)
Dept: UROGYNECOLOGY | Facility: CLINIC | Age: 38
End: 2023-08-29
Payer: COMMERCIAL

## 2023-08-29 VITALS
OXYGEN SATURATION: 98 % | HEIGHT: 66 IN | HEART RATE: 78 BPM | SYSTOLIC BLOOD PRESSURE: 120 MMHG | DIASTOLIC BLOOD PRESSURE: 70 MMHG | BODY MASS INDEX: 20.09 KG/M2 | TEMPERATURE: 97.5 F | WEIGHT: 125 LBS

## 2023-08-29 DIAGNOSIS — R35.0 FREQUENCY OF MICTURITION: ICD-10-CM

## 2023-08-29 PROCEDURE — 51741 ELECTRO-UROFLOWMETRY FIRST: CPT

## 2023-08-29 PROCEDURE — 99214 OFFICE O/P EST MOD 30 MIN: CPT | Mod: 25

## 2023-08-29 NOTE — HISTORY OF PRESENT ILLNESS
[FreeTextEntry1] : Very pleasant 38-year-old woman  1 para 0 seen initially in 2023 for acute urinary retention associated with prolapse and early pregnancy.  She had a catheter for 1 to 2 weeks and we have followed the growth of the uterus out of the pelvis and improvement in bladder emptying.  She came today with good voiding and occasional symptoms of hesitancy.  A uroflow was performed today and had an output of 175 cc with a peak urinary flow rate of 18 cc/s.  Sterile vaginal examination demonstrates normal vaginal vault with the uterus out of the pelvis.  My impression is that the patient is anatomic kinking of the bladder neck or urethra is resolved I have counseled her that I like to see her a month prior to expected delivery so we can have counseling prepared for the Immediate postpartum.  When urinary retention may recur. Overall she is extremely happy and does not experience any difficulties voiding at the present time.  Review of previous notes, labs, current prescriptions was performed. History , Physical counseling was performed.  All questions answered in lay language Total time for encounter was     43  min A chaperone was present for the entirety of the encounter

## 2023-12-31 PROBLEM — Z20.2 POSSIBLE EXPOSURE TO STD: Status: RESOLVED | Noted: 2017-01-17 | Resolved: 2023-12-31

## 2024-01-12 ENCOUNTER — APPOINTMENT (OUTPATIENT)
Dept: UROGYNECOLOGY | Facility: CLINIC | Age: 39
End: 2024-01-12
Payer: COMMERCIAL

## 2024-01-12 VITALS
SYSTOLIC BLOOD PRESSURE: 122 MMHG | HEART RATE: 79 BPM | BODY MASS INDEX: 20.09 KG/M2 | DIASTOLIC BLOOD PRESSURE: 80 MMHG | HEIGHT: 66 IN | TEMPERATURE: 97.4 F | OXYGEN SATURATION: 99 % | WEIGHT: 125 LBS

## 2024-01-12 PROCEDURE — 99214 OFFICE O/P EST MOD 30 MIN: CPT

## 2024-01-12 NOTE — HISTORY OF PRESENT ILLNESS
[FreeTextEntry1] : 38-year-old pregnant patient  1 para 0 initially seen in early pregnancy for prolapse and retention in pregnancy.  She came with an indwelling catheter.  She has used self-catheterization techniques and pessary but has been without a pessary for several weeks now.  Voiding is without any problem or straining.  She had gynecologic examination with her obstetrician within the last week so I did not do a pelvic examination.  She is 37 weeks and showing nicely in the suprapubic region.  I did  regarding post delivery recurrence of prolapse and voiding dysfunction and we reviewed the techniques for self-catheterization and I provided her with a #4 ring pessary.  I asked her at the time of birth to discuss with her obstetrician whether she has stitches and whether would be acceptable to use a pessary if she has problems with retention.  She will see me on an elective basis post delivery but we want her to be prepared for self-catheterization and pessary should she have acute retention.    Review of previous notes, labs, current prescriptions was performed. History , Physical counseling was performed.  All questions answered in lay language Total time for encounter was  44     min A chaperone was present for the entirety of the encounter

## 2024-02-06 ENCOUNTER — APPOINTMENT (OUTPATIENT)
Dept: UROGYNECOLOGY | Facility: CLINIC | Age: 39
End: 2024-02-06
Payer: COMMERCIAL

## 2024-02-06 VITALS
HEIGHT: 66 IN | WEIGHT: 140 LBS | SYSTOLIC BLOOD PRESSURE: 118 MMHG | HEART RATE: 82 BPM | BODY MASS INDEX: 22.5 KG/M2 | DIASTOLIC BLOOD PRESSURE: 72 MMHG | OXYGEN SATURATION: 99 % | TEMPERATURE: 97.1 F

## 2024-02-06 DIAGNOSIS — N81.9 FEMALE GENITAL PROLAPSE, UNSPECIFIED: ICD-10-CM

## 2024-02-06 DIAGNOSIS — R39.11 HESITANCY OF MICTURITION: ICD-10-CM

## 2024-02-06 PROCEDURE — 99213 OFFICE O/P EST LOW 20 MIN: CPT

## 2025-05-12 ENCOUNTER — APPOINTMENT (OUTPATIENT)
Dept: UROGYNECOLOGY | Facility: CLINIC | Age: 40
End: 2025-05-12

## 2025-05-12 VITALS
TEMPERATURE: 97 F | HEIGHT: 66 IN | BODY MASS INDEX: 20.89 KG/M2 | HEART RATE: 90 BPM | SYSTOLIC BLOOD PRESSURE: 104 MMHG | WEIGHT: 130 LBS | DIASTOLIC BLOOD PRESSURE: 60 MMHG | OXYGEN SATURATION: 98 %

## 2025-05-12 DIAGNOSIS — N81.9 FEMALE GENITAL PROLAPSE, UNSPECIFIED: ICD-10-CM

## 2025-05-12 DIAGNOSIS — Z34.90 ENCOUNTER FOR SUPERVISION OF NORMAL PREGNANCY, UNSPECIFIED, UNSPECIFIED TRIMESTER: ICD-10-CM

## 2025-05-12 LAB
BILIRUB UR QL STRIP: NEGATIVE
COLLECTION METHOD: NORMAL
GLUCOSE UR-MCNC: NEGATIVE
HCG UR QL: 0.2 EU/DL
HGB UR QL STRIP.AUTO: NORMAL
KETONES UR-MCNC: NEGATIVE
LEUKOCYTE ESTERASE UR QL STRIP: NEGATIVE
NITRITE UR QL STRIP: NEGATIVE
PH UR STRIP: 5.5
PROT UR STRIP-MCNC: NEGATIVE
SP GR UR STRIP: 1.02

## 2025-05-12 PROCEDURE — 81002 URINALYSIS NONAUTO W/O SCOPE: CPT

## 2025-05-12 PROCEDURE — 51701 INSERT BLADDER CATHETER: CPT | Mod: 59

## 2025-05-12 PROCEDURE — 99459 PELVIC EXAMINATION: CPT

## 2025-05-12 PROCEDURE — 99213 OFFICE O/P EST LOW 20 MIN: CPT | Mod: 25

## 2025-05-13 LAB
AMORPHOUS PHOSPHATE CRYSTALS: PRESENT
APPEARANCE: ABNORMAL
BACTERIA: NEGATIVE /HPF
BILIRUBIN URINE: NEGATIVE
BLOOD URINE: NEGATIVE
CAST: 0 /LPF
COLOR: YELLOW
EPITHELIAL CELLS: 1 /HPF
GLUCOSE QUALITATIVE U: NEGATIVE MG/DL
KETONES URINE: NEGATIVE MG/DL
LEUKOCYTE ESTERASE URINE: NEGATIVE
MICROSCOPIC-UA: NORMAL
NITRITE URINE: NEGATIVE
PH URINE: 6
PROTEIN URINE: NORMAL MG/DL
RED BLOOD CELLS URINE: 2 /HPF
REVIEW: NORMAL
SPECIFIC GRAVITY URINE: 1.02
UROBILINOGEN URINE: 0.2 MG/DL
WHITE BLOOD CELLS URINE: 0 /HPF

## 2025-05-14 LAB — BACTERIA UR CULT: NORMAL

## 2025-06-09 ENCOUNTER — APPOINTMENT (OUTPATIENT)
Dept: UROGYNECOLOGY | Facility: CLINIC | Age: 40
End: 2025-06-09